# Patient Record
Sex: MALE | Race: BLACK OR AFRICAN AMERICAN | Employment: UNEMPLOYED | URBAN - METROPOLITAN AREA
[De-identification: names, ages, dates, MRNs, and addresses within clinical notes are randomized per-mention and may not be internally consistent; named-entity substitution may affect disease eponyms.]

---

## 2021-08-02 ENCOUNTER — HOSPITAL ENCOUNTER (OUTPATIENT)
Dept: NUTRITION | Age: 51
Discharge: HOME | End: 2021-08-02
Payer: MEDICAID

## 2021-08-02 PROCEDURE — 97802 MEDICAL NUTRITION INDIV IN: CPT

## 2021-08-02 NOTE — PROGRESS NOTES
[address] [place] [address]  [address] [facility], [address] [facility]  Phone: [phone] Fax: [phone]   Nutrition Assessment  Medical Nutrition Therapy   Outpatient Initial Evaluation         Patient Name: Alexis Romero : 1970   Treatment Diagnosis: Type 2 DM   Referral Source: Yeny Clark MD [facility]): 2021     Gender: male Age: 46 y.o. Ht: 69 in Wt:  259 lb  kg   BMI: 38.2 BMR   Male  BMR Female      Past Medical History:  Diabetes, High Blood Pressure, Depression, Smoker 1 ppd      Pertinent Medications:   Includes: Aspirin, B Complex, Cardizem, Vitamin D, Pepcid, Vistaril, Pepcid, Novolog 50 units BID, Risperdal, Thiamin, Trazodone, Vraylar      Biochemical Data:        Assessment:   Patient is present to learn about nutrition related to diabetes. Patient reports that he checks his blood sugars 3 times per day. Patient reports that his blood sugar this morning was 248, which he reports is normal for him. Patient reports that his mother does most grocery shopping and cooking. Patient sleeps around 4 hours per night- reports he has a lot on his mind. Patient rides his bike 1 mile 3-4 days per week. Food & Nutrition: 24 Hour Recall:  Breakfast: eggs  Lunch: turkey sandwich   Dinner: ribs, cabbage, rice   Drinks: juice, lemonade, milk, water  Snacks: pretzels, popcorn, popsicle        Estimate Needs   Calories:  2200 Protein: 138 Carbs: 248 Fat: 73   Kcal/day  g/day  g/day  g/day        percent: 25  39  30               Nutrition Diagnosis    Undesirable food choices related to food and nutrition related knowledge deficit as evidenced by patient unable to identify carbohydrate sources. Nutrition Intervention &  Education: Educated patient on the need for a consistent carbohydrate intake related to diabetic control and weight loss. Educated patient on nutrition label reading, emphasizing carbohydrates and serving size.  Educated patient on protein sources, encouraged patient to incorporate mostly lean protein source with all carbohydrates. Encouraged patient to exercise after meals when possible. Handouts Provided: [x]  Carbohydrates  [x]  Protein  [x]  Non-starchy Vegatbles  []  Food Label  [x]  Meal and Snack Ideas  []  Food Journals [x]  Diabetes  []  Cholesterol  []  Sodium  []  Gen Nutr Guidelines  []  SBGM Guidelines  []  Others:   Information Reviewed with: Patient    Readiness to Change Stage: []  Pre-contemplative    [x]  Contemplative  []  Preparation               [x]  Action                  []  Maintenance   Potential Barriers to Learning: []  Decline in memory    []  Language barrier   []  Other:  []  Emotional                  []  Limited mobility  [x]  Lack of motivation     [] Vision, hearing or cognitive impairment   Expected Compliance: Poor due to poor patient participation and engagment during visit. Nutritional Goal - To promote lifestyle changes to result in:    [x]  Weight loss  [x]  Improved diabetic control  []  Decreased cholesterol levels  []  Decreased blood pressure  []  Weight maintenance []  Preventing any interactions associated with food allergies  []  Adequate weight gain toward goal weight  []  Other:        Patient Goals:   1. Patient will consume three meals per day 4-5 hours apart. 2. Patient will include a protein at all drinks and snacks. 3. Patient will consume 64 ounces of water daily.       Dietitian Signature: Brandon Ortiz RD Date: 8/2/2021   Follow-up:  Time: 12:47 PM